# Patient Record
Sex: FEMALE | Race: BLACK OR AFRICAN AMERICAN
[De-identification: names, ages, dates, MRNs, and addresses within clinical notes are randomized per-mention and may not be internally consistent; named-entity substitution may affect disease eponyms.]

---

## 2017-01-30 ENCOUNTER — HOSPITAL ENCOUNTER (EMERGENCY)
Dept: HOSPITAL 62 - ER | Age: 22
Discharge: HOME | End: 2017-01-30
Payer: SELF-PAY

## 2017-01-30 VITALS — SYSTOLIC BLOOD PRESSURE: 122 MMHG | DIASTOLIC BLOOD PRESSURE: 88 MMHG

## 2017-01-30 DIAGNOSIS — R05: ICD-10-CM

## 2017-01-30 DIAGNOSIS — R50.9: ICD-10-CM

## 2017-01-30 DIAGNOSIS — L02.31: Primary | ICD-10-CM

## 2017-01-30 DIAGNOSIS — Z87.891: ICD-10-CM

## 2017-01-30 DIAGNOSIS — B34.9: ICD-10-CM

## 2017-01-30 DIAGNOSIS — E66.9: ICD-10-CM

## 2017-01-30 LAB
ALBUMIN SERPL-MCNC: 3.9 G/DL (ref 3.5–5)
ALP SERPL-CCNC: 80 U/L (ref 38–126)
ALT SERPL-CCNC: 32 U/L (ref 9–52)
ANION GAP SERPL CALC-SCNC: 13 MMOL/L (ref 5–19)
APPEARANCE UR: (no result)
AST SERPL-CCNC: 29 U/L (ref 14–36)
BASOPHILS # BLD AUTO: 0 10^3/UL (ref 0–0.2)
BASOPHILS NFR BLD AUTO: 0.2 % (ref 0–2)
BILIRUB DIRECT SERPL-MCNC: 0 MG/DL (ref 0–0.3)
BILIRUB SERPL-MCNC: 0.5 MG/DL (ref 0.2–1.3)
BILIRUB UR QL STRIP: NEGATIVE
BUN SERPL-MCNC: 7 MG/DL (ref 7–20)
CALCIUM: 9.1 MG/DL (ref 8.4–10.2)
CHLORIDE SERPL-SCNC: 99 MMOL/L (ref 98–107)
CO2 SERPL-SCNC: 26 MMOL/L (ref 22–30)
CREAT SERPL-MCNC: 0.8 MG/DL (ref 0.52–1.25)
EOSINOPHIL # BLD AUTO: 0 10^3/UL (ref 0–0.6)
EOSINOPHIL NFR BLD AUTO: 0.4 % (ref 0–6)
ERYTHROCYTE [DISTWIDTH] IN BLOOD BY AUTOMATED COUNT: 17.6 % (ref 11.5–14)
GLUCOSE SERPL-MCNC: 94 MG/DL (ref 75–110)
GLUCOSE UR STRIP-MCNC: NEGATIVE MG/DL
HCT VFR BLD CALC: 36.3 % (ref 36–47)
HGB BLD-MCNC: 11.5 G/DL (ref 12–15.5)
HGB HCT DIFFERENCE: -1.8
KETONES UR STRIP-MCNC: (no result) MG/DL
LYMPHOCYTES # BLD AUTO: 3 10^3/UL (ref 0.5–4.7)
LYMPHOCYTES NFR BLD AUTO: 25.8 % (ref 13–45)
MCH RBC QN AUTO: 22.5 PG (ref 27–33.4)
MCHC RBC AUTO-ENTMCNC: 31.7 G/DL (ref 32–36)
MCV RBC AUTO: 71 FL (ref 80–97)
MONOCYTES # BLD AUTO: 0.9 10^3/UL (ref 0.1–1.4)
MONOCYTES NFR BLD AUTO: 7.4 % (ref 3–13)
NEUTROPHILS # BLD AUTO: 7.7 10^3/UL (ref 1.7–8.2)
NEUTS SEG NFR BLD AUTO: 66.2 % (ref 42–78)
NITRITE UR QL STRIP: NEGATIVE
PH UR STRIP: 6 [PH] (ref 5–9)
POTASSIUM SERPL-SCNC: 3.8 MMOL/L (ref 3.6–5)
PROT SERPL-MCNC: 7.5 G/DL (ref 6.3–8.2)
PROT UR STRIP-MCNC: NEGATIVE MG/DL
RBC # BLD AUTO: 5.12 10^6/UL (ref 3.72–5.28)
SODIUM SERPL-SCNC: 138.1 MMOL/L (ref 137–145)
SP GR UR STRIP: 1.01
UROBILINOGEN UR-MCNC: NEGATIVE MG/DL (ref ?–2)
WBC # BLD AUTO: 11.6 10^3/UL (ref 4–10.5)

## 2017-01-30 PROCEDURE — 87040 BLOOD CULTURE FOR BACTERIA: CPT

## 2017-01-30 PROCEDURE — 99283 EMERGENCY DEPT VISIT LOW MDM: CPT

## 2017-01-30 PROCEDURE — 96367 TX/PROPH/DG ADDL SEQ IV INF: CPT

## 2017-01-30 PROCEDURE — 85025 COMPLETE CBC W/AUTO DIFF WBC: CPT

## 2017-01-30 PROCEDURE — 81001 URINALYSIS AUTO W/SCOPE: CPT

## 2017-01-30 PROCEDURE — 84703 CHORIONIC GONADOTROPIN ASSAY: CPT

## 2017-01-30 PROCEDURE — 80053 COMPREHEN METABOLIC PANEL: CPT

## 2017-01-30 PROCEDURE — 96375 TX/PRO/DX INJ NEW DRUG ADDON: CPT

## 2017-01-30 PROCEDURE — 82272 OCCULT BLD FECES 1-3 TESTS: CPT

## 2017-01-30 PROCEDURE — 96365 THER/PROPH/DIAG IV INF INIT: CPT

## 2017-01-30 PROCEDURE — 87804 INFLUENZA ASSAY W/OPTIC: CPT

## 2017-01-30 PROCEDURE — 96361 HYDRATE IV INFUSION ADD-ON: CPT

## 2017-01-30 PROCEDURE — 36415 COLL VENOUS BLD VENIPUNCTURE: CPT

## 2017-01-30 NOTE — ER DOCUMENT REPORT
ED General





- General


Information source: Patient


TRAVEL OUTSIDE OF THE [place] IN LAST 30 DAYS: No





- HPI


Patient complains to provider of: Swollen, Tender Area 


Onset: Other - ~3-4 days ago


Onset/Duration: Gradual, Worse


Associated symptoms: Other - See Narrative


Recently seen / [facility]: [facility]





<OPAL COLBY - Last Filed: 01/30/17 18:01>





<YENI EKYES - Last Filed: 01/30/17 23:01>





- General


Chief Complaint: Abscess


Stated Complaint: PAINFUL TAIL BONE


Notes: 


Patient is a 21-year-old female presenting to the emergency chief complaint 

tender swollen area at the top of her buttock cleft which she began to notice 3-

4 days ago patient states that she has been taking amoxicillin for the past 2 

days being treated for UTI.  Patient [facility] last[facility], but was not given any medication to treat the UTI.  Patient 

states that she went to urgent care Saturday, January 28, because she "cannot 

take it anymore.  I feel like my body is shutting down."  Patient states that 

on Saturday she had a fever of 103.8, cough, sneezes, rhinorrhea, abdominal pain

, and chills.  Patient states that today she has started to have black and 

watery stool, abdominal pain, and "trouble flatulating." Patient denies having 

the flu shot this year.  


 (OPAL COLBY)





This 21-year-old female patient comes emergency room complaining of onset 

Friday of dry cough fever aching all over.  [facility] 

[facility] possibly and being diagnosed with UTI with no treatment.  She went to 

an urgent care on Saturday for her fever of 103.8 which developed the day 

before with coughing, sneezing, runny nose, abdominal pain and chills.  She was 

diagnosed with urinary tract infection started on amoxicillin.  She reports 

early this morning about 7 AM, she had a large black and watery stool.  She did 

not get a flu shot.





She was quite reluctant to allow a rectal exam and is now been almost 15 hours 

since her bowel movement this morning, and she reports that she has not had any 

bowel movement since the one around 7 AM.  She did show me a picture on her 

start phone of this stool and it is in fact black and looks like it is old 

blood possibly from an upper GI bleed.  Her hemoglobin is 11.5, there is no 

prior lab work in our computer to compare to.  Her Chem-12 is unremarkable, her 

white blood cell count 11,666 segs no bands, her urine is dilute with 2 WBCs 

and trace bacteria and small leukocyte esterase.  A rapid flu test was negative.





At this time she is agreeable to rectal exam by the nurse to obtain specimens 

to test for blood since she still has no need to have a bowel movement. (YENI KEYES)





- Related Data


Allergies/Adverse Reactions: 


 





No Known Allergies Allergy (Verified 01/30/17 15:29)


 











Past Medical History





- General


Information source: Patient


Last Menstrual Period: 01/23/2017





- Social History


Smoking Status: Former Smoker


Chew tobacco use (# tins/day): No


Frequency of alcohol use: None


Drug Abuse: None


Family History: CVA, DM, Hypertension, Thyroid Disfunction


Patient has suicidal ideation: No


Patient has homicidal ideation: No


Pulmonary Medical History: Reports: Hx Asthma


Neurological Medical History: Reports: Hx Migraine


Renal/ Medical History: Denies: Hx Peritoneal Dialysis


Psychiatric Medical History: Reports: Hx Anxiety, Hx Bipolar Disorder, Hx 

Depression


Past Surgical History: Reports: Hx Oral Surgery - wisdom teeth





- Immunizations


Immunizations up to date: Yes


Hx Diphtheria, Pertussis, Tetanus Vaccination: Yes





<OPAL COLBY - Last Filed: 01/30/17 18:01>





Review of Systems





- Review of Systems


Constitutional: See HPI, Chills, Fever


EENT: See HPI, Nose congestion


Cardiovascular: No symptoms reported


Respiratory: See HPI, Cough


Gastrointestinal: No symptoms reported, Abdominal pain, Diarrhea, Black stools, 

Other - "trouble flatulating"


Genitourinary: No symptoms reported


Female Genitourinary: No symptoms reported


Musculoskeletal: No symptoms reported


Skin: See HPI, Lumps - Tender lump lower back (tail bone area)


Hematologic/Lymphatic: No symptoms reported


Neurological/Psychological: No symptoms reported


-: Yes All other systems reviewed and negative





<OPAL COLBY - Last Filed: 01/30/17 18:01>





Physical Exam





- General


General appearance: Alert





- HEENT


Head: Normocephalic, Atraumatic


Eyes: Normal


Pupils: PERRL





- Respiratory


Respiratory status: No respiratory distress


Chest status: Nontender


Breath sounds: Normal


Chest palpation: Normal





- Cardiovascular


Rhythm: Regular


Heart sounds: Normal auscultation


Murmur: No





- Abdominal


Inspection: Obese


Bowel sounds: Hyperactive


Tenderness: Tender - Diffuse mild tenderness to palpation





- Back


Back: Tender - Indurated, tender 1 cm area - left upper buttock cleft. Did not 

feel fluctuant.





- Extremities


General upper extremity: Normal inspection


General lower extremity: Normal inspection





- Neurological


Neuro grossly intact: Yes


Cognition: Normal


Orientation: AAOx4


Weston Coma Scale Eye Opening: Spontaneous


Weston Coma Scale Verbal: Oriented


Weston Coma Scale Motor: Obeys Commands


Lizz Coma Scale Total: 15


Speech: Normal





- Psychological


Associated symptoms: Normal affect, Normal mood





- Skin


Skin Temperature: Warm


Skin Moisture: Dry


Skin Color: Normal





<OPAL COLBY - Last Filed: 01/30/17 18:01>





Course





- Laboratory


Result Diagrams: 


 01/30/17 18:45





 01/30/17 18:45





<YENI KEYES - Last Filed: 01/30/17 23:01>





- Vital Signs


Vital signs: 


 











Temp Pulse Resp BP Pulse Ox


 


 99.8 F   104 H  13   119/60   100 


 


 01/30/17 19:29  01/30/17 15:30  01/30/17 19:08  01/30/17 19:08  01/30/17 19:08








 (OPAL COLBY)


 (YENI KEYES)





- Laboratory


Laboratory results interpreted by me: 


 











  01/30/17 01/30/17





  18:45 20:06


 


WBC  11.6 H 


 


Hgb  11.5 L 


 


MCV  71 L 


 


MCH  22.5 L 


 


MCHC  31.7 L 


 


RDW  17.6 H 


 


Urine Ketones   TRACE H


 


Urine Blood   SMALL H


 


Ur Leukocyte Esterase   SMALL H











 (YENI KEYES)





Discharge





<OPAL COLBY - Last Filed: 01/30/17 18:01>





<YENI KEYES - Last Filed: 01/30/17 23:01>





- Discharge


Clinical Impression: 


 Viral syndrome, Left buttock abscess





Condition: Stable


Disposition: HOME, SELF-CARE


Additional Instructions: 


Viral Syndrome:





     The physician has diagnosed a viral infection.  Viruses not only cause 

"colds," but can cause many different symptoms including generalized aching, 

fever, headache, cough, diarrhea, nausea, vomiting, and fatigue.


     The treatment, for the most part, is simply relief of symptoms. This means 

that antibiotics are usually not given.  Rest, fluids, pain medications and, 

occasionally, medication for the specific symptoms that are most bothersome 

will be prescribed. Use good handwashing to avoid passing the virus to others. 

Shared toys should be cleaned with disinfectant. Clean the toilets, sinks, and 

counter surfaces in bathrooms. Launder clothing in hot water.


     Contact the physician if you develop any new or unusual symptoms such as 

severe headache, stiff neck, high fever, chest pain, productive cough, or 

shortness of breath.  You should be rechecked if you don't see marked 

improvement within seven to 10 days.





Abscess:





     You are developing an abscess (boil).  This a pus-forming infection, 

usually due to staph.  Some boils may be left to drain on their own, but most 

require lancing.


     From the time the tender lump first appears, it may be three or four days 

before the abscess is ready to kurt.  Local heat and rest help at this stage 

of treatment.  An antibiotic may prevent spread of the infection.


     Once the abscess is opened, packing may be placed into it.  This is done 

so pus is not sealed inside by premature closure of the cavity. The packing 

will be removed at your follow-up visit or you may be advised to remove it 

yourself at home.  Sometimes this packing must be replaced a few times during 

healing.


     The wound will heal with surprisingly little scar.  Depending on the size 

and location of an abscess, healing can take one to four weeks.


     You may shower and wash the area around the incision site two or three 

times a day.


     Antibiotics may be prescribed, but are usually not necessary after an 

abscess has been drained.


     If you develop fever, chilling, worsening pain, or increasing swelling in 

the area, call the doctor or return immediately.








////////////////////////////////////////////////////////////////////////////////

///////////////////////////////////////////////////////////////////////////////


Most of your symptoms of cough, fever, chills, and abdominal pain are due to a 

viral infection.


The stool was tested and found to be negative for blood.


The urine appears to have very little evidence of infection at this point, this 

may be due to the antibiotics you are taking.


The painful area in her buttock is a developing abscess, it is not fluctuant or 

ready to be opened at this point.


     Frequently, if antibiotics are started this point and you soak in warm tub 

frequently, the infection will calm down and not require incision and drainage.





Take medications as prescribed.


Soak in a warm toe frequently.


Get plenty of rest and drink plenty of fluids.


Take Tylenol every 4 hours with Motrin or Aleve for fever and achiness.


Follow-up with your primary care provider if not improving.


Return to the emergency room if the painful area to your buttock continues to 

enlarge.


Prescriptions: 


Clindamycin HCl 300 mg PO QID #28 capsule


Hydrocodone/Acetaminophen [Norco 5-325 mg Tablet] 1 tab PO Q4 PRN #15 tablet


 PRN Reason: 


Scribe Attestation: 





01/30/17 23:01


I personally performed the services described in the documentation, reviewed 

and edited the documentation which was dictated to the scribe in my presence, 

and it accurately records my words and actions. (YENI KEYES)





Scribe Documentation





- Scribe


Written by Jackson:: Opal Colby 01/30/2017 175


acting as scribe for :: Richie





<OPAL COLBY - Last Filed: 01/30/17 18:01>

## 2017-01-30 NOTE — ER DOCUMENT REPORT
ED Medical Screen (RME)





- General


Stated Complaint: PAINFUL TAIL BONE


Notes: 


4 days painful area overlying tail bone


hot, tender, no drainage


never had an abscess, denies MRSA


no trauma





likely an abscess





I have greeted and performed a rapid initial assessment of this patient. A 

comprehensive ED assessment and evaluation of the patient, analysis of test 

results and completion of the medical decision making process will be conducted 

by additional ED providers.





TRAVEL OUTSIDE OF THE [place] IN LAST 30 DAYS: No





- Related Data


Allergies/Adverse Reactions: 


 





No Known Allergies Allergy (Verified 12/28/16 08:25)


 











Past Medical History


Pulmonary Medical History: Reports: Hx Asthma


Neurological Medical History: Reports: Hx Migraine


Psychiatric Medical History: Reports: Hx Anxiety, Hx Bipolar Disorder, Hx 

Depression


Past Surgical History: Reports: Hx Oral Surgery - wisdom teeth





- Immunizations


Immunizations up to date: Yes


Hx Diphtheria, Pertussis, Tetanus Vaccination: Yes

## 2017-02-02 ENCOUNTER — HOSPITAL ENCOUNTER (EMERGENCY)
Dept: HOSPITAL 62 - ER | Age: 22
Discharge: HOME | End: 2017-02-02
Payer: SELF-PAY

## 2017-02-02 VITALS — DIASTOLIC BLOOD PRESSURE: 75 MMHG | SYSTOLIC BLOOD PRESSURE: 129 MMHG

## 2017-02-02 DIAGNOSIS — L05.01: Primary | ICD-10-CM

## 2017-02-02 DIAGNOSIS — F17.200: ICD-10-CM

## 2017-02-02 DIAGNOSIS — R00.0: ICD-10-CM

## 2017-02-02 DIAGNOSIS — J45.909: ICD-10-CM

## 2017-02-02 DIAGNOSIS — D72.829: ICD-10-CM

## 2017-02-02 DIAGNOSIS — R50.81: ICD-10-CM

## 2017-02-02 LAB
ALBUMIN SERPL-MCNC: 3.6 G/DL (ref 3.5–5)
ALP SERPL-CCNC: 86 U/L (ref 38–126)
ALT SERPL-CCNC: 26 U/L (ref 9–52)
ANION GAP SERPL CALC-SCNC: 12 MMOL/L (ref 5–19)
AST SERPL-CCNC: 30 U/L (ref 14–36)
BASOPHILS # BLD AUTO: 0.1 10^3/UL (ref 0–0.2)
BASOPHILS NFR BLD AUTO: 0.5 % (ref 0–2)
BILIRUB DIRECT SERPL-MCNC: 0 MG/DL (ref 0–0.3)
BILIRUB SERPL-MCNC: 0.7 MG/DL (ref 0.2–1.3)
BUN SERPL-MCNC: 9 MG/DL (ref 7–20)
CALCIUM: 9.4 MG/DL (ref 8.4–10.2)
CHLORIDE SERPL-SCNC: 101 MMOL/L (ref 98–107)
CO2 SERPL-SCNC: 25 MMOL/L (ref 22–30)
CREAT SERPL-MCNC: 0.72 MG/DL (ref 0.52–1.25)
EOSINOPHIL # BLD AUTO: 0.1 10^3/UL (ref 0–0.6)
EOSINOPHIL NFR BLD AUTO: 0.3 % (ref 0–6)
ERYTHROCYTE [DISTWIDTH] IN BLOOD BY AUTOMATED COUNT: 17.4 % (ref 11.5–14)
GLUCOSE SERPL-MCNC: 106 MG/DL (ref 75–110)
HCT VFR BLD CALC: 32.8 % (ref 36–47)
HGB BLD-MCNC: 10.5 G/DL (ref 12–15.5)
HGB HCT DIFFERENCE: -1.3
LYMPHOCYTES # BLD AUTO: 2.4 10^3/UL (ref 0.5–4.7)
LYMPHOCYTES NFR BLD AUTO: 12.3 % (ref 13–45)
MCH RBC QN AUTO: 22.5 PG (ref 27–33.4)
MCHC RBC AUTO-ENTMCNC: 32 G/DL (ref 32–36)
MCV RBC AUTO: 71 FL (ref 80–97)
MONOCYTES # BLD AUTO: 1.2 10^3/UL (ref 0.1–1.4)
MONOCYTES NFR BLD AUTO: 6 % (ref 3–13)
NEUTROPHILS # BLD AUTO: 15.5 10^3/UL (ref 1.7–8.2)
NEUTS SEG NFR BLD AUTO: 80.9 % (ref 42–78)
POTASSIUM SERPL-SCNC: 3.9 MMOL/L (ref 3.6–5)
PROT SERPL-MCNC: 6.6 G/DL (ref 6.3–8.2)
RBC # BLD AUTO: 4.65 10^6/UL (ref 3.72–5.28)
SODIUM SERPL-SCNC: 138 MMOL/L (ref 137–145)
WBC # BLD AUTO: 19.2 10^3/UL (ref 4–10.5)

## 2017-02-02 PROCEDURE — 99283 EMERGENCY DEPT VISIT LOW MDM: CPT

## 2017-02-02 PROCEDURE — 85025 COMPLETE CBC W/AUTO DIFF WBC: CPT

## 2017-02-02 PROCEDURE — S0119 ONDANSETRON 4 MG: HCPCS

## 2017-02-02 PROCEDURE — 87205 SMEAR GRAM STAIN: CPT

## 2017-02-02 PROCEDURE — 87077 CULTURE AEROBIC IDENTIFY: CPT

## 2017-02-02 PROCEDURE — 87075 CULTR BACTERIA EXCEPT BLOOD: CPT

## 2017-02-02 PROCEDURE — 80053 COMPREHEN METABOLIC PANEL: CPT

## 2017-02-02 PROCEDURE — 36415 COLL VENOUS BLD VENIPUNCTURE: CPT

## 2017-02-02 PROCEDURE — 87070 CULTURE OTHR SPECIMN AEROBIC: CPT

## 2017-02-02 PROCEDURE — 10080 I&D PILONIDAL CYST SIMPLE: CPT

## 2017-02-02 PROCEDURE — 0H98XZZ DRAINAGE OF BUTTOCK SKIN, EXTERNAL APPROACH: ICD-10-PCS | Performed by: NURSE PRACTITIONER

## 2017-02-02 NOTE — ER DOCUMENT REPORT
ED Medical Screen (RME)





- General


Chief Complaint: Abscess


Stated Complaint: ABSCESS


Mode of Arrival: Wheelchair


Information source: Patient


Notes: 


Patient returns today with complaints of abscess to her buttocks.  Patient was 

evaluated 2 days ago and reports it's worse.  Patient does have a temperature 

103 with sinus tach.  [facility] 

[facility].  Patient sitting in a wheelchair unable to obtain evaluate abscess.








I have greeted and performed a rapid initial assessment of this patient.  A 

comprehensive ED assessment and evaluation of the patient, analysis of test 

results and completion of the medical decision making process will be conducted 

by additional ED providers.


TRAVEL OUTSIDE OF THE [place] IN LAST 30 DAYS: No





- Related Data


Allergies/Adverse Reactions: 


 





No Known Allergies Allergy (Verified 02/02/17 17:54)


 











Past Medical History


Pulmonary Medical History: Reports: Hx Asthma


Neurological Medical History: Reports: Hx Migraine


Renal/ Medical History: Denies: Hx Peritoneal Dialysis


Psychiatric Medical History: Reports: Hx Anxiety, Hx Bipolar Disorder, Hx 

Depression


Past Surgical History: Reports: Hx Oral Surgery - wisdom teeth





- Immunizations


Immunizations up to date: Yes


Hx Diphtheria, Pertussis, Tetanus Vaccination: Yes

## 2017-02-02 NOTE — ER DOCUMENT REPORT
ED Skin Rash/Insect Bite/Abscs





- General


Chief Complaint: Abscess


Stated Complaint: ABSCESS


Time seen by provider: 19:20


Mode of Arrival: Wheelchair


Information source: Patient


Notes: 


20 yo female with enlarging right gluteal crest abscess. Fever today. Increased 

pain. Has been taking clindamycin prescribed a few days ago when it was a small 

firm area seen by dr. baker.


TRAVEL OUTSIDE OF THE [place] IN LAST 30 DAYS: No





- Related Data


Allergies/Adverse Reactions: 


 





No Known Allergies Allergy (Verified 02/02/17 17:54)


 











Past Medical History





- General


Information source: Patient





- Social History


Smoking Status: Current Every Day Smoker


Chew tobacco use (# tins/day): Yes


Frequency of alcohol use: Rare


Drug Abuse: None


Lives with: Spouse/Significant other


Family History: CVA, DM, Hypertension, Thyroid Disfunction


Patient has suicidal ideation: No


Patient has homicidal ideation: No


Pulmonary Medical History: Reports: Hx Asthma


Neurological Medical History: Reports: Hx Migraine


Renal/ Medical History: Denies: Hx Peritoneal Dialysis


Psychiatric Medical History: Reports: Hx Anxiety, Hx Bipolar Disorder, Hx 

Depression


Past Surgical History: Reports: Hx Oral Surgery - wisdom teeth





- Immunizations


Immunizations up to date: Yes


Hx Diphtheria, Pertussis, Tetanus Vaccination: Yes





Review of Systems





- Review of Systems


Constitutional: See HPI


EENT: No symptoms reported


Cardiovascular: No symptoms reported


Respiratory: No symptoms reported


Gastrointestinal: No symptoms reported


Genitourinary: No symptoms reported


Female Genitourinary: No symptoms reported


Musculoskeletal: No symptoms reported


Skin: See HPI


Hematologic/Lymphatic: No symptoms reported


Neurological/Psychological: No symptoms reported





Physical Exam





- Vital signs


Vitals: 


 











Temp Pulse Resp BP Pulse Ox


 


 103.1 F H  113 H  20   134/66 H  100 


 


 02/02/17 17:51  02/02/17 17:51  02/02/17 17:51  02/02/17 17:51  02/02/17 17:51











Interpretation: Tachycardic - mild 104, Febrile





- General


General appearance: Appears well, Alert





- HEENT


Head: Normocephalic, Atraumatic


Eyes: Normal


Pupils: PERRL


Neck: Supple





- Respiratory


Respiratory status: No respiratory distress


Chest status: Nontender


Breath sounds: Normal


Chest palpation: Normal





- Cardiovascular


Rhythm: Regular


Heart sounds: Normal auscultation


Murmur: No





- Abdominal


Inspection: Normal


Distension: No distension


Bowel sounds: Normal


Tenderness: Nontender


Organomegaly: No organomegaly





- Back


Back: Tender - right gluteal crest abscess





- Extremities


General upper extremity: Normal inspection, Nontender, Normal color, Normal ROM

, Normal temperature


General lower extremity: Normal inspection, Nontender, Normal color, Normal ROM

, Normal temperature, Normal weight bearing.  No: Raciel's sign





- Neurological


Neuro grossly intact: Yes


Cognition: Normal


Orientation: AAOx4


Lizz Coma Scale Eye Opening: Spontaneous


White Coma Scale Verbal: Oriented


Lizz Coma Scale Motor: Obeys Commands


Lizz Coma Scale Total: 15


Speech: Normal


Motor strength normal: LUE, RUE, LLE, RLE


Sensory: Normal





- Psychological


Associated symptoms: Normal affect, Normal mood





- Skin


Skin Temperature: Warm


Skin Moisture: Dry


Skin Color: Normal


Skin irregularity: Abscess - right gluteal crest


Character of irregularity: Erythematous


Irregularity with: Tenderness, Warmth, Induration - 10 cm, flucutance 2 cm, 

Inflammation





Course





- Re-evaluation


Re-evalutation: 


02/02/17 20:55


vitals are stable at discharge. fever down.














- Vital Signs


Vital signs: 


 











Temp Pulse Resp BP Pulse Ox


 


 99.1 F   91   16   129/75 H  98 


 


 02/02/17 20:55  02/02/17 20:55  02/02/17 20:55  02/02/17 20:55  02/02/17 20:55














- Laboratory


Result Diagrams: 


 02/02/17 20:04





 02/02/17 20:04


Laboratory results interpreted by me: 


 











  02/02/17





  20:04


 


WBC  19.2 H


 


Hgb  10.5 L


 


Hct  32.8 L


 


MCV  71 L


 


MCH  22.5 L


 


RDW  17.4 H


 


Seg Neutrophils %  80.9 H


 


Lymphocytes %  12.3 L


 


Absolute Neutrophils  15.5 H














Procedures





- Incision and Drainage


  ** Right Buttock


Time completed: 20:36


Type: Simple


Anesthetic type: 1% Lidocaine


mL's of anesthetic: 5


Blade size: 11


I&D procedure: Betadine prep applied


Incision Method: Incision made by scalpel - x cut


Amount/type of drainage: large pus and blood


Adult Front & Back picture: 


  __________________________














  __________________________





 1 - abscess








Discharge





- Discharge


Clinical Impression: 


 pilonidal abscess Incision and drainage





Leukocytosis


Qualifiers:


 Leukocytosis type: unspecified Qualified Code(s): D72.829 - Elevated white 

blood cell count, unspecified





Fever


Qualifiers:


 Fever type: other Qualified Code(s): R50.81 - Fever presenting with conditions 

classified elsewhere





Condition: Good


Disposition: HOME, SELF-CARE


Instructions:  Abscess ([facility]), Oral Narcotic Medication ([facility]), Post Incision and 

Drainage, Clindamycin ([facility])


Additional Instructions: 


to er for recheck tomorrow


motrin for fever


pain medication


warm compress


keep dressing on until tomorrow





Prescriptions: 


Oxycodone HCl/Acetaminophen [Percocet  Mg Tablet] 1 each PO Q4HP PRN #15 

tablet


 PRN Reason: 


Forms:  Return to School, Return to Work


Referrals: 


[facility],NO [Primary Care Provider] - Follow up as needed

## 2017-02-03 ENCOUNTER — HOSPITAL ENCOUNTER (EMERGENCY)
Dept: HOSPITAL 62 - ER | Age: 22
Discharge: LEFT BEFORE DISCHARGE | End: 2017-02-03
Payer: SELF-PAY

## 2017-02-03 DIAGNOSIS — L05.01: ICD-10-CM

## 2017-02-03 DIAGNOSIS — Z53.9: Primary | ICD-10-CM

## 2017-02-03 PROCEDURE — 99281 EMR DPT VST MAYX REQ PHY/QHP: CPT

## 2017-02-03 NOTE — ER DOCUMENT REPORT
ED Medical Screen (RME)





- General


Stated Complaint: RECHECK SKIN PROBLEM


Notes: 


patient had an right pilonidal abscess drained yesterday, here for wound recheck





I have greeted and performed a rapid initial assessment of this patient. A 

comprehensive ED assessment and evaluation of the patient, analysis of test 

results and completion of the medical decision making process will be conducted 

by additional ED providers.








TRAVEL OUTSIDE OF THE [place] IN LAST 30 DAYS: No





- Related Data


Allergies/Adverse Reactions: 


 





No Known Allergies Allergy (Verified 02/02/17 17:54)


 











Past Medical History


Pulmonary Medical History: Reports: Hx Asthma


Neurological Medical History: Reports: Hx Migraine


Renal/ Medical History: Denies: Hx Peritoneal Dialysis


Psychiatric Medical History: Reports: Hx Anxiety, Hx Bipolar Disorder, Hx 

Depression


Past Surgical History: Reports: Hx Oral Surgery - wisdom teeth





- Immunizations


Immunizations up to date: Yes


Hx Diphtheria, Pertussis, Tetanus Vaccination: Yes

## 2019-12-11 ENCOUNTER — HOSPITAL ENCOUNTER (OUTPATIENT)
Dept: HOSPITAL 62 - RAD | Age: 24
End: 2019-12-11
Attending: ORTHOPAEDIC SURGERY
Payer: OTHER GOVERNMENT

## 2019-12-11 DIAGNOSIS — M77.8: Primary | ICD-10-CM

## 2019-12-11 PROCEDURE — 77002 NEEDLE LOCALIZATION BY XRAY: CPT

## 2019-12-11 PROCEDURE — 73222 MRI JOINT UPR EXTREM W/DYE: CPT

## 2019-12-11 PROCEDURE — 25246 INJECTION FOR WRIST X-RAY: CPT

## 2019-12-11 PROCEDURE — A9576 INJ PROHANCE MULTIPACK: HCPCS

## 2019-12-11 NOTE — RADIOLOGY REPORT (SQ)
EXAM DESCRIPTION:  FLUORO/NEEDLE PLACEMENT; ARTHRO WRIST INJECTION



COMPLETED DATE/TIME:  12/11/2019 3:35 pm



REASON FOR STUDY:  M77.8 OTHER ENTHESOPATHIES, NOT ELSEWHERE CLASSIFIED M77.8  OTHER ENTHESOPATHIES, 
NOT ELSEWHERE CLASSIFIED



COMPARISON:  None.



FLUOROSCOPY TIME:  16 seconds.

1 image submitted to PACS.



LIMITATIONS:  None.



PROCEDURE:  The procedure, risks, benefits and alternatives were discussed with the patient who then 
provided written consent for the procedure. The left wrist was marked and a time-out was called for c
orrect marking verification.  Entry site marked using fluoroscopic guidance.  The wrist was prepped a
nd draped using sterile technique.   Local anesthesia achieved using 1% lidocaine injection.   Hypode
rmic needle introduced into the joint space under direct fluoroscopic visualization.  Non-ionic contr
ast instilled to confirm intra-articular position.  Dilute gadolinium solution then injected.   Needl
e removed and entry site covered with sterile bandage. No immediate complications noted.



TECHNIQUE:  Digital images acquired during fluoroscopy and stored on PACS.   Patient immediately take
n to the MR suite for additional imaging.

INJECTION LOCATION: Left wrist.

CONTRAST TYPE AND AMOUNT: 2 mL Dotarem/Saline mixture.



IMPRESSION:  SUCCESSFUL NEEDLE PLACEMENT AND INJECTION FOR LEFT WRIST MR ARTHROGRAM.



COMMENT:  Quality :  Final reports for procedures using fluoroscopy that document radiation exp
osure indices, or exposure time and number of fluorographic images (if radiation exposure indices are
 not available)



TECHNICAL DOCUMENTATION:  JOB ID:  [id number]

 2011 [employer]- All Rights Reserved



Reading location - IP/workstation name: [email or web address]

## 2019-12-11 NOTE — RADIOLOGY REPORT (SQ)
EXAM DESCRIPTION:  MRI LT UPPER JOINT WITH



COMPLETED DATE/TIME:  12/11/2019 4:11 pm



REASON FOR STUDY:  M77.8 OTHER ENTHESOPATHIES, NOT ELSEWHERE CLASSIFIED M77.8  OTHER ENTHESOPATHIES, 
NOT ELSEWHERE CLASSIFIED



COMPARISON:  None.



TECHNIQUE:  Left wrist post-arthrogram imaging includes T1 and T1 and T2 fat sat sequences.



LIMITATIONS:  None.



FINDINGS:  JOINT DISTENSION: Adequate.  No loose body.

BONE MARROW: No alteration of signal to suggest marrow replacement or edema. No occult fracture. No l
arge osteophytes.

CARPAL ALIGNMENT AND ARTICULATION: Normal congruity of sigmoid notch at level of distal ruj without p
ositive or negative ulnar variance. Normal capitolunate angle. No widening of scapholunate articulati
on.

SCAPHOLUNATE LIGAMENT: There is contrast in the middle compartment, a small defect in the scapholunat
e ligament is present on coronal series 12, image 9/16

LUNATO-TRIQUETRAL LIGAMENT: Without tear. No contrast in middle carpal compartment.

TFC COMPLEX: There is contrast in the distal radioulnar joint.  Ulnar attachments of the triangular f
ibrocartilage are indistinct on coronal series 12 image 10/16.  Meniscus intact. Extensor carpi ulnar
is tendon normal without tendinopathy. No contrast in distal RUJ.

EXTRINSIC LIGAMENTS AND DISTAL RADIO-ULNAR JOINT: Dorsal and volar distal RUJ ligaments intact withou
t subluxation of the distal ulna with respect to the radius.

1-6 EXTENSOR COMPARTMENTS: Normal. Specifically no tendinopathy of the abductor pollicis longus or ex
tensor pollicis brevis to suggest de Quervains syndrome.

CARPAL TUNNEL AND MEDIAN NERVE: Normal volume and morphology of carpal tunnel proximal at the level o
f the radiocarpal joint and distally at the hook of the hamate. No thickening or signal alteration of
 median nerve.

OTHER: Along the dorsal aspect of the radiocarpal joint, a 13 x 4 mm ganglion cyst is present, best s
hown on axial T2 series 5, image 10/20, and coronal series 6 image 13/16.



IMPRESSION:  Contrast from the radiocarpal joint extends into the intercarpal joints through small de
fect in the scapholunate ligament

Contrast from the radiocarpal joint extends into the distal radioulnar joint through a defect along t
he ulnar attachment of the triangular fibrocartilage

13 x 4 mm ganglion cyst along the dorsal aspect of the radioscaphoid joint



TECHNICAL DOCUMENTATION:  JOB ID:  [id number]

 2011 [employer]- All Rights Reserved



Reading location - IP/workstation name: [email or web address]

## 2019-12-11 NOTE — RADIOLOGY REPORT (SQ)
EXAM DESCRIPTION:  FLUORO/NEEDLE PLACEMENT; ARTHRO WRIST INJECTION



COMPLETED DATE/TIME:  12/11/2019 3:35 pm



REASON FOR STUDY:  M77.8 OTHER ENTHESOPATHIES, NOT ELSEWHERE CLASSIFIED M77.8  OTHER ENTHESOPATHIES, 
NOT ELSEWHERE CLASSIFIED



COMPARISON:  None.



FLUOROSCOPY TIME:  16 seconds.

1 image submitted to PACS.



LIMITATIONS:  None.



PROCEDURE:  The procedure, risks, benefits and alternatives were discussed with the patient who then 
provided written consent for the procedure. The left wrist was marked and a time-out was called for c
orrect marking verification.  Entry site marked using fluoroscopic guidance.  The wrist was prepped a
nd draped using sterile technique.   Local anesthesia achieved using 1% lidocaine injection.   Hypode
rmic needle introduced into the joint space under direct fluoroscopic visualization.  Non-ionic contr
ast instilled to confirm intra-articular position.  Dilute gadolinium solution then injected.   Needl
e removed and entry site covered with sterile bandage. No immediate complications noted.



TECHNIQUE:  Digital images acquired during fluoroscopy and stored on PACS.   Patient immediately take
n to the MR suite for additional imaging.

INJECTION LOCATION: Left wrist.

CONTRAST TYPE AND AMOUNT: 2 mL Dotarem/Saline mixture.



IMPRESSION:  SUCCESSFUL NEEDLE PLACEMENT AND INJECTION FOR LEFT WRIST MR ARTHROGRAM.



COMMENT:  Quality :  Final reports for procedures using fluoroscopy that document radiation exp
osure indices, or exposure time and number of fluorographic images (if radiation exposure indices are
 not available)



TECHNICAL DOCUMENTATION:  JOB ID:  [id number]

 2011 [employer]- All Rights Reserved



Reading location - IP/workstation name: [email or web address]

## 2020-06-22 ENCOUNTER — HOSPITAL ENCOUNTER (EMERGENCY)
Dept: HOSPITAL 62 - ER | Age: 25
LOS: 1 days | Discharge: HOME | End: 2020-06-23
Payer: OTHER GOVERNMENT

## 2020-06-22 DIAGNOSIS — Z88.0: ICD-10-CM

## 2020-06-22 DIAGNOSIS — J45.909: ICD-10-CM

## 2020-06-22 DIAGNOSIS — R63.0: ICD-10-CM

## 2020-06-22 DIAGNOSIS — Z88.2: ICD-10-CM

## 2020-06-22 DIAGNOSIS — Z88.1: ICD-10-CM

## 2020-06-22 DIAGNOSIS — D72.829: ICD-10-CM

## 2020-06-22 DIAGNOSIS — F17.200: ICD-10-CM

## 2020-06-22 DIAGNOSIS — R10.84: Primary | ICD-10-CM

## 2020-06-22 DIAGNOSIS — R19.7: ICD-10-CM

## 2020-06-22 DIAGNOSIS — Z79.899: ICD-10-CM

## 2020-06-22 DIAGNOSIS — R10.11: ICD-10-CM

## 2020-06-22 DIAGNOSIS — R14.0: ICD-10-CM

## 2020-06-22 LAB
ADD MANUAL DIFF: NO
ALBUMIN SERPL-MCNC: 4.3 G/DL (ref 3.5–5)
ALP SERPL-CCNC: 81 U/L (ref 38–126)
ANION GAP SERPL CALC-SCNC: 5 MMOL/L (ref 5–19)
APPEARANCE UR: CLEAR
APTT PPP: (no result) S
AST SERPL-CCNC: 30 U/L (ref 14–36)
BASOPHILS # BLD AUTO: 0 10^3/UL (ref 0–0.2)
BASOPHILS NFR BLD AUTO: 0.2 % (ref 0–2)
BILIRUB DIRECT SERPL-MCNC: 0 MG/DL (ref 0–0.4)
BILIRUB SERPL-MCNC: 0.7 MG/DL (ref 0.2–1.3)
BILIRUB UR QL STRIP: NEGATIVE
BUN SERPL-MCNC: 10 MG/DL (ref 7–20)
CALCIUM: 9 MG/DL (ref 8.4–10.2)
CHLORIDE SERPL-SCNC: 106 MMOL/L (ref 98–107)
CO2 SERPL-SCNC: 26 MMOL/L (ref 22–30)
EOSINOPHIL # BLD AUTO: 0 10^3/UL (ref 0–0.6)
EOSINOPHIL NFR BLD AUTO: 0.4 % (ref 0–6)
ERYTHROCYTE [DISTWIDTH] IN BLOOD BY AUTOMATED COUNT: 15.9 % (ref 11.5–14)
GLUCOSE SERPL-MCNC: 91 MG/DL (ref 75–110)
GLUCOSE UR STRIP-MCNC: NEGATIVE MG/DL
HCT VFR BLD CALC: 40.5 % (ref 36–47)
HGB BLD-MCNC: 13.4 G/DL (ref 12–15.5)
KETONES UR STRIP-MCNC: NEGATIVE MG/DL
LYMPHOCYTES # BLD AUTO: 1.3 10^3/UL (ref 0.5–4.7)
LYMPHOCYTES NFR BLD AUTO: 9.2 % (ref 13–45)
MCH RBC QN AUTO: 26.7 PG (ref 27–33.4)
MCHC RBC AUTO-ENTMCNC: 33 G/DL (ref 32–36)
MCV RBC AUTO: 81 FL (ref 80–97)
MONOCYTES # BLD AUTO: 0.3 10^3/UL (ref 0.1–1.4)
MONOCYTES NFR BLD AUTO: 2.6 % (ref 3–13)
NEUTROPHILS # BLD AUTO: 12 10^3/UL (ref 1.7–8.2)
NEUTS SEG NFR BLD AUTO: 87.6 % (ref 42–78)
NITRITE UR QL STRIP: NEGATIVE
PH UR STRIP: 5 [PH] (ref 5–9)
PLATELET # BLD: 298 10^3/UL (ref 150–450)
POTASSIUM SERPL-SCNC: 4.6 MMOL/L (ref 3.6–5)
PROT SERPL-MCNC: 7.3 G/DL (ref 6.3–8.2)
PROT UR STRIP-MCNC: NEGATIVE MG/DL
RBC # BLD AUTO: 5 10^6/UL (ref 3.72–5.28)
SP GR UR STRIP: 1.01
TOTAL CELLS COUNTED % (AUTO): 100 %
UROBILINOGEN UR-MCNC: NEGATIVE MG/DL (ref ?–2)
WBC # BLD AUTO: 13.7 10^3/UL (ref 4–10.5)

## 2020-06-22 PROCEDURE — 81001 URINALYSIS AUTO W/SCOPE: CPT

## 2020-06-22 PROCEDURE — 96360 HYDRATION IV INFUSION INIT: CPT

## 2020-06-22 PROCEDURE — 74177 CT ABD & PELVIS W/CONTRAST: CPT

## 2020-06-22 PROCEDURE — 99284 EMERGENCY DEPT VISIT MOD MDM: CPT

## 2020-06-22 PROCEDURE — 36415 COLL VENOUS BLD VENIPUNCTURE: CPT

## 2020-06-22 PROCEDURE — 96372 THER/PROPH/DIAG INJ SC/IM: CPT

## 2020-06-22 PROCEDURE — 83690 ASSAY OF LIPASE: CPT

## 2020-06-22 PROCEDURE — 80053 COMPREHEN METABOLIC PANEL: CPT

## 2020-06-22 PROCEDURE — 85025 COMPLETE CBC W/AUTO DIFF WBC: CPT

## 2020-06-22 PROCEDURE — 81025 URINE PREGNANCY TEST: CPT

## 2020-06-22 NOTE — ER DOCUMENT REPORT
ED Medical Screen (RME)





- General


Chief Complaint: Abdominal Pain


Stated Complaint: ABDOMINAL PAIN


Time Seen by Provider: 06/22/20 20:19


Information source: Patient


Notes: 





HPI; a 5-year-old female presents to the emergency room complaining of worsening

abdominal pain for the past 2 days.  Started with diarrhea yesterday.  Complains

of nausea but no vomiting.  No fever.  No recent travel.  No COVID-19 exposure. 

No urinary symptoms.  States she was at [facility] earlier today had a 

KUB which she was told shows a questionable small bowel obstruction.  States 

they attempted to give her an enema with worsening symptoms.  Referred to the 

emergency room for further evaluation.





PE: Alert and oriented x3.  Moderate distress noted.  Lungs are clear to 

auscultation without rales, rhonchi, wheezes.  Heart: Regular rate rhythm 

without murmurs, rubs, gallops.  Abdomen soft, no guarding, no rebound, 

generalized tenderness on palpation.  No active bowel sounds.





I have greeted and performed a rapid initial assessment of this patient.  A 

comprehensive ED assessment and evaluation of the patient, analysis of test 

results and completion of the medical decision making process will be conducted 

by additional ED providers.  I have specifically instructed the patient or 

family members with the patient to immediately return to any nursing staff 

should anything change in the patient's condition or with their chief complaint.


TRAVEL OUTSIDE OF THE [place] IN LAST 30 DAYS: No





- Related Data


Allergies/Adverse Reactions: 


                                        





Sulfa (Sulfonamide Antibiotics) Allergy (Verified 06/22/20 20:20)


   











Past Medical History


Pulmonary Medical History: Reports: Hx Asthma


Neurological Medical History: Reports: Hx Migraine


Renal/ Medical History: Denies: Hx Peritoneal Dialysis


Psychiatric Medical History: Reports: Hx Anxiety, Hx Bipolar Disorder, Hx 

Depression


Past Surgical History: Reports: Hx Oral Surgery - wisdom teeth





- Immunizations


Immunizations up to date: Yes


Hx Diphtheria, Pertussis, Tetanus Vaccination: Yes





Physical Exam





- Vital signs


Vitals: 





                                        











Temp Pulse Resp BP Pulse Ox


 


 99.2 F   89   18   128/85 H  99 


 


 06/22/20 19:57  06/22/20 19:57  06/22/20 19:57  06/22/20 19:57  06/22/20 19:57














Course





- Vital Signs


Vital signs: 





                                        











Temp Pulse Resp BP Pulse Ox


 


 99.2 F   89   18   128/85 H  99 


 


 06/22/20 19:57  06/22/20 19:57  06/22/20 19:57  06/22/20 19:57  06/22/20 19:57














Doctor's Discharge





- Discharge


Referrals: 


JOSE RICKS,  [Primary Care Provider] - Follow up as needed

## 2020-06-23 VITALS — DIASTOLIC BLOOD PRESSURE: 70 MMHG | SYSTOLIC BLOOD PRESSURE: 118 MMHG

## 2020-06-23 NOTE — RADIOLOGY REPORT (SQ)
CLINICAL HISTORY:  abdominal pain 



COMPARISON: None.



TECHNIQUE: CT ABDOMEN PELVIS WITH IV CONTRAST on 6/22/2020 8:24

PM CDT



This exam was performed according to our departmental

dose-optimization program, which includes automated exposure

control, adjustment of the mA and/or kV according to patient size

and/or use of iterative reconstruction technique.



FINDINGS: 



Lower lungs are clear.



Abdomen: The liver is normal in appearance. There is no biliary

dilatation. Gallbladder is normal in appearance. The pancreas and

spleen are normal in appearance. The adrenal glands and kidneys

are unremarkable.



Abdominal aorta is normal in course and caliber without aneurysm.

There is no free air. There is no retroperitoneal adenopathy.



Pelvis: There is no bowel obstruction. Urinary bladder is

unremarkable. There is small amount of free pelvic fluid. Uterus

is normal in size. Appendix is normal.



Skeleton: There are no acute osseous findings. No suspicious bony

lesions.



IMPRESSION: 



No acute process.

## 2020-06-23 NOTE — ER DOCUMENT REPORT
ED General





- General


Chief Complaint: Abdominal Pain


Stated Complaint: ABDOMINAL PAIN


Time Seen by Provider: 06/22/20 20:19


Primary Care Provider: 


OPAL PINZON FNP-C [Primary Care Provider] - Follow up as needed


Notes: 





25-year-old female with no pertinent past medical history presenting today with 

acute abdominal pain x2 days.  Describes as diffuse started in the right upper 

quadrant and then it began to spread.  She is trying to take ibuprofen to help 

alleviate the pain.  She was seen over urgent care who did a KUB which showed a 

gas pattern suspicious for SBO versus ileus.  Stated that she was told if her 

symptoms worsen she was to report to the emergency department.  Patient 

continues to have the sharp diffuse abdominal pain described as a knife scraping

her insides.  Patient denies wanting to take any pain medications.  Reports a 

bloating feeling.  She states she is unable to pass gas.  No recent travel. She 

has had a decreased appetite last time she tried to eat with this afternoon it 

was chicken noodle soup.  Has had non bloody diarrhea starting on Sunday 

occurring approximately q hour. She denies any headaches, fevers, chills, or 

additional symptoms.


TRAVEL OUTSIDE OF THE [place] IN LAST 30 DAYS: No





- Related Data


Allergies/Adverse Reactions: 


                                        





amoxicillin Allergy (Verified 06/22/20 20:25)


   


clindamycin Allergy (Verified 06/22/20 20:25)


   


Sulfa (Sulfonamide Antibiotics) Allergy (Verified 06/22/20 20:20)


   








Home Medications: IRON





Past Medical History





- General


Information source: Patient





- Social History


Smoking Status: Current Every Day Smoker


Frequency of alcohol use: None


Drug Abuse: None


Family History: CVA, DM, Hypertension, Thyroid Disfunction


Patient has homicidal ideation: No


Pulmonary Medical History: Reports: Hx Asthma


Neurological Medical History: Reports: Hx Migraine


Renal/ Medical History: Denies: Hx Peritoneal Dialysis


Psychiatric Medical History: Reports: Hx Anxiety, Hx Bipolar Disorder, Hx 

Depression


Past Surgical History: Reports: Hx Oral Surgery - wisdom teeth





- Immunizations


Immunizations up to date: Yes


Hx Diphtheria, Pertussis, Tetanus Vaccination: Yes





Review of Systems





- Review of Systems


Constitutional: No symptoms reported


EENT: No symptoms reported


Cardiovascular: No symptoms reported


Respiratory: No symptoms reported


Gastrointestinal: See HPI


Genitourinary: No symptoms reported


Female Genitourinary: No symptoms reported


Musculoskeletal: No symptoms reported


Skin: No symptoms reported


Neurological/Psychological: No symptoms reported





Physical Exam





- Vital signs


Vitals: 


                                        











Temp Pulse Resp BP Pulse Ox


 


 99.2 F   89   18   128/85 H  99 


 


 06/22/20 19:57  06/22/20 19:57  06/22/20 19:57  06/22/20 19:57  06/22/20 19:57











Interpretation: Normal





- Notes


Notes: 





Adult General:





GENERAL: Alert, interacts well. No acute distress


HEAD: Normocephalic, atraumatic


EYES: Pupils equal, round and reactive to light. Extraocular movements intact.


ENT: Oral mucosa moist, tongue midline.  Oropharynx unremarkable.


NECK: Full range of motion.  Supple.  Trachea midline.


LUNGS: Clear to auscultation bilaterally, no wheezes, rales, or rhonchi.  No 

respiratory distress.  Nontender chest wall.


HEART: Regular rate and rhythm.  No murmurs, rubs or gallops.


ABDOMEN: Soft, diffuse mild tenderness to light and deep palpation. No rebound 

or guarding. Nondistended. Bowel sounds decreased in left lower quadrant. 


GENITOURINARY: Deferred


EXTREMITIES: Moves all 4 extremities spontaneously.  No edema, normal radial and

dorsal pedis pulses bilaterally.  No cyanosis.


BACK:  Moves all extremities with full range of motion.


NEUROLOGICAL: Alert and oriented x3.  Normal speech.  Strength 5/ 5 in all 

extremities.


PSYCH: Normal affect, normal mood.


SKIN: Warm, dry, normal turgor.  No rashes or lesions noted.





Course





- Re-evaluation


Re-evalutation: 





06/23/20 02:06


Patient CBC shows a mild leukocytosis at 13.7.


06/23/20 03:34


Patient was reevaluated.  Discussed findings of CT exam with patient.  States 

she is no longer nauseous but she is still experiencing the sharp abdominal pain

approximately 3.  Denies any excessive use of ibuprofen states she only took 

approximately 400 mg once for her pain.  A re-offered the patient pain 

medication.  But she continues to deny wanting pain meds.





Discussed with patient that the CT scan did not show a bowel obstruction.  Shows

no acute processes.  Appendix is normal, gall bladder is normal.  As patient 

reports excessive gassiness will prescribe her Gas-X.





Reevaluated patient she states her pain level has decreased after taking the G

as-X.  She continues to deny nausea.  As she is having greater than 10 bowel 

movements a day will prescribe her antibiotics.  I recommend tylenol for the 

pain. You may take gas-x to help alleviate as well. I discussed with her that 

this is likely gastroenteritis.  Discussed with her the importance of following 

up with primary care as soon as possible for symptoms.  Discussed return 

precautions to occlude worsening symptoms or development of new symptoms.  All 

questions answered








- Vital Signs


Vital signs: 


                                        











Temp Pulse Resp BP Pulse Ox


 


 98.0 F   63   17   118/70   100 


 


 06/23/20 05:03  06/23/20 05:03  06/23/20 05:03  06/23/20 05:03  06/23/20 05:03














- Laboratory


Result Diagrams: 


                                 06/22/20 21:36





                                 06/22/20 21:36


Laboratory results interpreted by me: 


                                        











  06/22/20 06/22/20





  21:36 21:40


 


WBC  13.7 H 


 


MCH  26.7 L 


 


RDW  15.9 H 


 


Lymph % (Auto)  9.2 L 


 


Mono % (Auto)  2.6 L 


 


Absolute Neuts (auto)  12.0 H 


 


Seg Neutrophils %  87.6 H 


 


Urine Blood   SMALL H














Discharge





- Discharge


Clinical Impression: 


Abdominal pain


Qualifiers:


 Abdominal location: generalized Qualified Code(s): R10.84 - Generalized 

abdominal pain





Condition: Stable


Disposition: HOME, SELF-CARE


Instructions:  Abdominal Pain ([facility])


Additional Instructions: 


Your CT scan shows no bowel obstruction.  You have a mild leukocytosis.  As you 

been having multiple bowel movements in a day I will prescribe antibiotics.  

Please take medication as prescribed.  Please follow-up with your primary care 

provider as soon as possible.  If your symptoms worsen or you develop new 

symptoms please return to the emergency department.


Prescriptions: 


Ciprofloxacin HCl [Cipro 500 mg Tablet] 500 mg PO BID 3 Days #6 tablet


Referrals: 


OPAL PINZON FNP-C [Primary Care Provider] - Follow up as needed